# Patient Record
Sex: FEMALE | Race: WHITE | Employment: OTHER | URBAN - METROPOLITAN AREA
[De-identification: names, ages, dates, MRNs, and addresses within clinical notes are randomized per-mention and may not be internally consistent; named-entity substitution may affect disease eponyms.]

---

## 2017-06-18 ENCOUNTER — OFFICE VISIT (OUTPATIENT)
Dept: URGENT CARE | Facility: URGENT CARE | Age: 77
End: 2017-06-18
Payer: COMMERCIAL

## 2017-06-18 VITALS
WEIGHT: 153 LBS | OXYGEN SATURATION: 95 % | TEMPERATURE: 97.5 F | DIASTOLIC BLOOD PRESSURE: 73 MMHG | BODY MASS INDEX: 25.07 KG/M2 | SYSTOLIC BLOOD PRESSURE: 135 MMHG | HEART RATE: 75 BPM

## 2017-06-18 DIAGNOSIS — R82.90 NONSPECIFIC FINDING ON EXAMINATION OF URINE: ICD-10-CM

## 2017-06-18 DIAGNOSIS — N30.01 ACUTE CYSTITIS WITH HEMATURIA: Primary | ICD-10-CM

## 2017-06-18 LAB
ALBUMIN UR-MCNC: 30 MG/DL
APPEARANCE UR: ABNORMAL
BACTERIA #/AREA URNS HPF: ABNORMAL /HPF
BILIRUB UR QL STRIP: NEGATIVE
COLOR UR AUTO: YELLOW
GLUCOSE UR STRIP-MCNC: NEGATIVE MG/DL
HGB UR QL STRIP: ABNORMAL
KETONES UR STRIP-MCNC: NEGATIVE MG/DL
LEUKOCYTE ESTERASE UR QL STRIP: ABNORMAL
NITRATE UR QL: NEGATIVE
NON-SQ EPI CELLS #/AREA URNS LPF: ABNORMAL /LPF
PH UR STRIP: 6 PH (ref 5–7)
RBC #/AREA URNS AUTO: ABNORMAL /HPF (ref 0–2)
SP GR UR STRIP: 1.02 (ref 1–1.03)
URN SPEC COLLECT METH UR: ABNORMAL
UROBILINOGEN UR STRIP-ACNC: 0.2 EU/DL (ref 0.2–1)
WBC #/AREA URNS AUTO: >100 /HPF (ref 0–2)

## 2017-06-18 PROCEDURE — 81001 URINALYSIS AUTO W/SCOPE: CPT | Performed by: STUDENT IN AN ORGANIZED HEALTH CARE EDUCATION/TRAINING PROGRAM

## 2017-06-18 PROCEDURE — 87088 URINE BACTERIA CULTURE: CPT | Performed by: STUDENT IN AN ORGANIZED HEALTH CARE EDUCATION/TRAINING PROGRAM

## 2017-06-18 PROCEDURE — 87186 SC STD MICRODIL/AGAR DIL: CPT | Performed by: STUDENT IN AN ORGANIZED HEALTH CARE EDUCATION/TRAINING PROGRAM

## 2017-06-18 PROCEDURE — 99213 OFFICE O/P EST LOW 20 MIN: CPT | Performed by: STUDENT IN AN ORGANIZED HEALTH CARE EDUCATION/TRAINING PROGRAM

## 2017-06-18 PROCEDURE — 87086 URINE CULTURE/COLONY COUNT: CPT | Performed by: STUDENT IN AN ORGANIZED HEALTH CARE EDUCATION/TRAINING PROGRAM

## 2017-06-18 RX ORDER — CIPROFLOXACIN 250 MG/1
250 TABLET, FILM COATED ORAL 2 TIMES DAILY
Qty: 6 TABLET | Refills: 0
Start: 2017-06-18 | End: 2018-05-04

## 2017-06-18 RX ORDER — FLUCONAZOLE 150 MG/1
150 TABLET ORAL ONCE
Qty: 1 TABLET | Refills: 0
Start: 2017-06-18 | End: 2017-06-18

## 2017-06-18 RX ORDER — SOLIFENACIN SUCCINATE 10 MG/1
TABLET, FILM COATED ORAL
Refills: 3 | COMMUNITY
Start: 2017-04-10

## 2017-06-18 RX ORDER — SIMVASTATIN 40 MG
TABLET ORAL
Refills: 3 | COMMUNITY
Start: 2017-06-05

## 2017-06-18 NOTE — MR AVS SNAPSHOT
"              After Visit Summary   2017    Avelina Walker    MRN: [id number]           Patient Information     Date Of Birth          1940        Visit Information        Provider Department      2017 1:40 PM Mika Kimble PA-C [facility]        Today's Diagnoses     Acute cystitis with hematuria    -  1       Follow-ups after your visit        Who to contact     If you have questions or need follow up information about today's clinic visit or your schedule please contact [facility] directly at [phone].  Normal or non-critical lab and imaging results will be communicated to you by ConnectSofthart, letter or phone within 4 business days after the clinic has received the results. If you do not hear from us within 7 days, please contact the clinic through ConnectSofthart or phone. If you have a critical or abnormal lab result, we will notify you by phone as soon as possible.  Submit refill requests through StatAce or call your pharmacy and they will forward the refill request to us. Please allow 3 business days for your refill to be completed.          Additional Information About Your Visit        MyChart Information     StatAce lets you send messages to your doctor, view your test results, renew your prescriptions, schedule appointments and more. To sign up, go to [email or web address] . Click on \"Log in\" on the left side of the screen, which will take you to the Welcome page. Then click on \"Sign up Now\" on the right side of the page.     You will be asked to enter the access code listed below, as well as some personal information. Please follow the directions to create your username and password.     Your access code is: [email or web address]  Expires: 2017  2:31 PM     Your access code will  in 90 days. If you need help or a new code, please call your [facility] or [phone].        Care EveryWhere ID     This is your Care EveryWhere ID. This " could be used by other organizations to access your [place] medical records  [id number]        Your Vitals Were     Pulse Temperature Pulse Oximetry Breastfeeding? BMI (Body Mass Index)       75 97.5  F (36.4  C) (Oral) 95% No 25.07 kg/m2        Blood Pressure from Last 3 Encounters:   06/18/17 135/73   12/18/16 139/81   09/28/14 141/90    Weight from Last 3 Encounters:   06/18/17 153 lb (69.4 kg)   12/18/16 152 lb (68.9 kg)   09/28/14 147 lb (66.7 kg)              We Performed the Following     UA with Microscopic reflex to Culture          Today's Medication Changes          These changes are accurate as of: 6/18/17  2:31 PM.  If you have any questions, ask your nurse or doctor.               Start taking these medicines.        Dose/Directions    ciprofloxacin 250 MG tablet   Commonly known as:  CIPRO   Used for:  Acute cystitis with hematuria   Started by:  Mika Kimble PA-C        Dose:  250 mg   Take 1 tablet (250 mg) by mouth 2 times daily   Quantity:  6 tablet   Refills:  0       fluconazole 150 MG tablet   Commonly known as:  DIFLUCAN   Used for:  Acute cystitis with hematuria   Started by:  Mika Kimble PA-C        Dose:  150 mg   Take 1 tablet (150 mg) by mouth once for 1 dose   Quantity:  1 tablet   Refills:  0            Where to get your medicines      Some of these will need a paper prescription and others can be bought over the counter.  Ask your nurse if you have questions.     You don't need a prescription for these medications     ciprofloxacin 250 MG tablet    fluconazole 150 MG tablet                Primary Care Provider Office Phone # Fax #    Paolo Porter [phone]       [facility] [address]  [facility] [address]        Thank you!     Thank you for choosing [facility]  for your care. Our goal is always to provide you with excellent care. Hearing back from our patients is one way we can  continue to improve our services. Please take a few minutes to complete the written survey that you may receive in the mail after your visit with us. Thank you!             Your Updated Medication List - Protect others around you: Learn how to safely use, store and throw away your medicines at www.disposemymeds.org.          This list is accurate as of: 6/18/17  2:31 PM.  Always use your most recent med list.                   Brand Name Dispense Instructions for use    CELEBREX PO      Take  by mouth.       ciprofloxacin 250 MG tablet    CIPRO    6 tablet    Take 1 tablet (250 mg) by mouth 2 times daily       DAILY MULTIVITAMIN PO          fluconazole 150 MG tablet    DIFLUCAN    1 tablet    Take 1 tablet (150 mg) by mouth once for 1 dose       MIRAPEX PO      Take  by mouth.       simvastatin 40 MG tablet    ZOCOR     TK 1 T PO QPM       VESICARE 10 MG tablet   Generic drug:  solifenacin      TK 1 T PO QD

## 2017-06-18 NOTE — NURSING NOTE
"Chief Complaint   Patient presents with     UTI     Pt c/o dysuria since this morning.       Initial /73 (BP Location: Left arm, Patient Position: Chair, Cuff Size: Adult Regular)  Pulse 75  Temp 97.5  F (36.4  C) (Oral)  Wt 153 lb (69.4 kg)  SpO2 95%  Breastfeeding? No  BMI 25.07 kg/m2 Estimated body mass index is 25.07 kg/(m^2) as calculated from the following:    Height as of 12/18/16: 5' 5.5\" (1.664 m).    Weight as of this encounter: 153 lb (69.4 kg).  Medication Reconciliation: complete     Shara Mejia CMA (AAMA)      "

## 2017-06-18 NOTE — LETTER
[facility]  [address]  [place] [facility] [address]             June 21, 2017    Avelina Walker  [address]  [place] [place] [place] [address]              Dear Avelina,    The results of your recent tests were normal.  Enclosed is a copy of the results.  It was a pleasure to see you at your last appointment.    Your urine culture was positive. Finish the Cipro antibiotic and follow up with your Primary Care Provider if no resolution of symptoms.     Please notify patient with above.     Daisy Bermudez PA-C     Color Urine       Yellow   Appearance Urine       Slightly Cloudy   Glucose Urine      NEG mg/dL Negative   Bilirubin Urine      NEG Negative   Ketones Urine      NEG mg/dL Negative   Specific Gravity Urine      1.003 - 1.035 1.025   pH Urine      5.0 - 7.0 pH 6.0   Protein Albumin Urine      NEG mg/dL 30 (A)   Urobilinogen Urine      0.2 - 1.0 EU/dL 0.2   Nitrite Urine      NEG Negative   Blood Urine      NEG Large (A)   Leukocyte Esterase Urine      NEG Large (A)   Source       Midstream Urine   WBC Urine      0 - 2 /HPF >100 (A)   RBC Urine      0 - 2 /HPF 25-50 (A)   Squamous Epithelial /LPF Urine      FEW /LPF Few   Bacteria Urine      NEG /HPF Many (A)       Sincerely,      Daisy Bermudez PAC

## 2017-06-20 LAB
BACTERIA SPEC CULT: ABNORMAL
MICRO REPORT STATUS: ABNORMAL
MICROORGANISM SPEC CULT: ABNORMAL
SPECIMEN SOURCE: ABNORMAL

## 2017-09-16 ENCOUNTER — OFFICE VISIT (OUTPATIENT)
Dept: URGENT CARE | Facility: URGENT CARE | Age: 77
End: 2017-09-16
Payer: COMMERCIAL

## 2017-09-16 ENCOUNTER — RADIANT APPOINTMENT (OUTPATIENT)
Dept: GENERAL RADIOLOGY | Facility: CLINIC | Age: 77
End: 2017-09-16
Attending: FAMILY MEDICINE
Payer: COMMERCIAL

## 2017-09-16 VITALS
BODY MASS INDEX: 24.81 KG/M2 | SYSTOLIC BLOOD PRESSURE: 127 MMHG | DIASTOLIC BLOOD PRESSURE: 72 MMHG | WEIGHT: 151.4 LBS | TEMPERATURE: 99.5 F | OXYGEN SATURATION: 93 % | HEART RATE: 84 BPM

## 2017-09-16 DIAGNOSIS — R05.9 COUGH: ICD-10-CM

## 2017-09-16 DIAGNOSIS — J20.9 ACUTE BRONCHITIS, UNSPECIFIED ORGANISM: Primary | ICD-10-CM

## 2017-09-16 PROCEDURE — 71020 XR CHEST 2 VW: CPT

## 2017-09-16 PROCEDURE — 99213 OFFICE O/P EST LOW 20 MIN: CPT | Performed by: FAMILY MEDICINE

## 2017-09-16 NOTE — PATIENT INSTRUCTIONS

## 2017-09-16 NOTE — MR AVS SNAPSHOT
After Visit Summary   9/16/2017    Avelina Walker    MRN: [id number]           Patient Information     Date Of Birth          1940        Visit Information        Provider Department      9/16/2017 10:00 AM Dennis Ponce MD [facility]        Today's Diagnoses     Cough    -  1    Acute bronchitis, unspecified organism          Care Instructions      Bronchitis, Viral (Adult)    You have a viral bronchitis. Bronchitis is inflammation and swelling of the lining of the lungs. This is often caused by an infection. Symptoms include a dry, hacking cough that is worse at night. The cough may bring up yellow-green mucus. You may also feel short of breath or wheeze. Other symptoms may include tiredness, chest discomfort, and chills.  Bronchitis that is caused by a virus is not treated with antibiotics. Instead, medicines may be given to help relieve symptoms. Symptoms can last up to 2 weeks, although the cough may last much longer.  This illness is contagious during the first few days and is spread through the air by coughing and sneezing, or by direct contact (touching the sick person and then touching your own eyes, nose, or mouth).  Most viral illnesses resolve within 10 to 14 days with rest and simple home remedies, although they may sometimes last for several weeks.  Home care    If symptoms are severe, rest at home for the first 2 to 3 days. When you go back to your usual activities, don't let yourself get too tired.    Do not smoke. Also avoid being exposed to secondhand smoke.    You may use over-the-counter medicine to control fever or pain, unless another pain medicine was prescribed. (Note: If you have chronic liver or kidney disease or have ever had a stomach ulcer or gastrointestinal bleeding, talk with your healthcare provider before using these medicines. Also talk to your provider if you are taking medicine to prevent blood clots.) Aspirin should never be given to  anyone younger than 18 years of age who is ill with a viral infection or fever. It may cause severe liver or brain damage.    Your appetite may be poor, so a light diet is fine. Avoid dehydration by drinking 6 to 8 glasses of fluids per day (such as water, soft drinks, sports drinks, juices, tea, or soup). Extra fluids will help loosen secretions in the nose and lungs.    Over-the-counter cough, cold, and sore-throat medicines will not shorten the length of the illness, but they may help to reduce symptoms. (Note: Do not use decongestants if you have high blood pressure.)  Follow-up care  Follow up with your healthcare provider, or as advised. If you had an X-ray or ECG (electrocardiogram), a specialist will review it. You will be notified of any new findings that may affect your care.  Note: If you are age 65 or older, or if you have a chronic lung disease or condition that affects your immune system, or you smoke, talk to your healthcare provider about having pneumococcal vaccinations and a yearly influenza vaccination (flu shot).  When to seek medical advice  Call your healthcare provider right away if any of these occur:    Fever of 100.4 F (38 C) or higher    Coughing up increased amounts of colored sputum    Weakness, drowsiness, headache, facial pain, ear pain, or a stiff neck  Call 911, or get immediate medical care  Contact emergency services right away if any of these occur:    Coughing up blood    Worsening weakness, drowsiness, headache, or stiff neck    Trouble breathing, wheezing, or pain with breathing  Date Last Reviewed: 9/13/2015 2000-2017 [employer]. [address] [place] [address], [place] [address]. All rights reserved. This information is not intended as a substitute for professional medical care. Always follow your healthcare professional's instructions.                Follow-ups after your visit        Who to contact     If you have questions or need follow up information about  "today's clinic visit or your schedule please contact [facility] directly at [phone].  Normal or non-critical lab and imaging results will be communicated to you by Etixhart, letter or phone within 4 business days after the clinic has received the results. If you do not hear from us within 7 days, please contact the clinic through Etixhart or phone. If you have a critical or abnormal lab result, we will notify you by phone as soon as possible.  Submit refill requests through Numerate or call your pharmacy and they will forward the refill request to us. Please allow 3 business days for your refill to be completed.          Additional Information About Your Visit        EtixharPatientFocus Information     Numerate lets you send messages to your doctor, view your test results, renew your prescriptions, schedule appointments and more. To sign up, go to [email or web address] . Click on \"Log in\" on the left side of the screen, which will take you to the Welcome page. Then click on \"Sign up Now\" on the right side of the page.     You will be asked to enter the access code listed below, as well as some personal information. Please follow the directions to create your username and password.     Your access code is: [email or web address]  Expires: 2017  2:31 PM     Your access code will  in 90 days. If you need help or a new code, please call your [place] clinic or [phone].        Care EveryWhere ID     This is your Care EveryWhere ID. This could be used by other organizations to access your [place] medical records  [id number]        Your Vitals Were     Pulse Temperature Pulse Oximetry BMI (Body Mass Index)          84 99.5  F (37.5  C) (Oral) 93% 24.81 kg/m2         Blood Pressure from Last 3 Encounters:   17 127/72   17 135/73   16 139/81    Weight from Last 3 Encounters:   17 151 lb 6.4 oz (68.7 kg)   17 153 lb (69.4 kg)   16 152 lb (68.9 kg)               Primary " Care Provider Office Phone # Fax #    Paolo Porter [phone]       [facility] [id number]  [place] [facility] [address]        Equal Access to Services     PERI DOMINGO : Hadolivier preston ku anao Socrystalali, waaxda luqadaha, qaybta kaalmada adeegyada, yvan millern urvashi cleary lamarko waddell. So [facility] [phone].    ATENCIÓN: Si habla español, tiene a silvestre disposición servicios gratuitos de asistencia lingüística. Llame al [phone].    We comply with applicable federal civil rights laws and [place] laws. We do not discriminate on the basis of race, color, national origin, age, disability sex, sexual orientation or gender identity.            Thank you!     Thank you for choosing [facility]  for your care. Our goal is always to provide you with excellent care. Hearing back from our patients is one way we can continue to improve our services. Please take a few minutes to complete the written survey that you may receive in the mail after your visit with us. Thank you!             Your Updated Medication List - Protect others around you: Learn how to safely use, store and throw away your medicines at www.disposemymeds.org.          This list is accurate as of: 9/16/17 10:42 AM.  Always use your most recent med list.                   Brand Name Dispense Instructions for use Diagnosis    CELEBREX PO      Take  by mouth.        ciprofloxacin 250 MG tablet    CIPRO    6 tablet    Take 1 tablet (250 mg) by mouth 2 times daily    Acute cystitis with hematuria       DAILY MULTIVITAMIN PO           MIRAPEX PO      Take  by mouth.        simvastatin 40 MG tablet    ZOCOR     TK 1 T PO QPM        VESICARE 10 MG tablet   Generic drug:  solifenacin      TK 1 T PO QD

## 2017-09-16 NOTE — PROGRESS NOTES
SUBJECTIVE:   Avelina Walker is a 77 year old female who presents to clinic today for the following health issues:      Cough, fever      Duration: 2 days    Description (location/character/radiation): chest    Intensity:  moderate    Accompanying signs and symptoms: cough, fever    History (similar episodes/previous evaluation): None    Precipitating or alleviating factors: None    Therapies tried and outcome: tylenol         Problem list and histories reviewed & adjusted, as indicated.  Additional history: as documented    Patient Active Problem List   Diagnosis     Blepharoconjunctivitis of both eyes     Past Surgical History:   Procedure Laterality Date     BREAST SURGERY      reductions     BUNIONECTOMY       COLONOSCOPY  7/16/2014    Procedure: COMBINED COLONOSCOPY, SINGLE BIOPSY/POLYPECTOMY BY BIOPSY;  Surgeon: Brenna Wu MD;  Location: [facility]     HYSTERECTOMY VAGINAL, BILATERAL SALPINGO-OOPHERECTOMY, COMBINED       ORTHOPEDIC SURGERY      left total knee replacement       Social History   Substance Use Topics     Smoking status: Never Smoker     Smokeless tobacco: Never Used     Alcohol use No     No family history on file.      Current Outpatient Prescriptions   Medication Sig Dispense Refill     simvastatin (ZOCOR) 40 MG tablet TK 1 T PO QPM  3     VESICARE 10 MG tablet TK 1 T PO QD  3     Multiple Vitamin (DAILY MULTIVITAMIN PO)        Celecoxib (CELEBREX PO) Take  by mouth.       Pramipexole Dihydrochloride (MIRAPEX PO) Take  by mouth.       ciprofloxacin (CIPRO) 250 MG tablet Take 1 tablet (250 mg) by mouth 2 times daily (Patient not taking: Reported on 9/16/2017) 6 tablet 0     Allergies   Allergen Reactions     Latex      No lab results found.   BP Readings from Last 3 Encounters:   09/16/17 127/72   06/18/17 135/73   12/18/16 139/81    Wt Readings from Last 3 Encounters:   09/16/17 151 lb 6.4 oz (68.7 kg)   06/18/17 153 lb (69.4 kg)   12/18/16 152 lb (68.9 kg)                  Labs  reviewed in EPIC          Reviewed and updated as needed this visit by clinical staff     Reviewed and updated as needed this visit by Provider         ROS:  Constitutional, HEENT, cardiovascular, pulmonary, gi and gu systems are negative, except as otherwise noted.      OBJECTIVE:   /72 (BP Location: Left arm, Patient Position: Chair, Cuff Size: Adult Regular)  Pulse 84  Temp 99.5  F (37.5  C) (Oral)  Wt 151 lb 6.4 oz (68.7 kg)  SpO2 93%  BMI 24.81 kg/m2  Body mass index is 24.81 kg/(m^2).  GENERAL: healthy, alert and no distress  EYES: Eyes grossly normal to inspection, PERRL and conjunctivae and sclerae normal  HENT: ear canals and TM's normal, nose and mouth without ulcers or lesions  NECK: no adenopathy, no asymmetry, masses, or scars and thyroid normal to palpation  RESP: lungs clear to auscultation - no rales, rhonchi or wheezes  CV: regular rate and rhythm, normal S1 S2, no S3 or S4, no murmur, click or rub, no peripheral edema and peripheral pulses strong, no calf tenderness or swelling  ABDOMEN: soft, nontender, no hepatosplenomegaly, no masses and bowel sounds normal  MS: no gross musculoskeletal defects noted, no edema  NEURO: Normal strength and tone, mentation intact and speech normal  PSYCH: mentation appears normal, affect normal/bright    XR CHEST 2 VW   9/16/2017 10:29 AM      HISTORY: cough, Cough     COMPARISON: None.     FINDINGS: The heart is negative.  The lungs are clear. The pulmonary  vasculature is normal.  The bones and soft tissues are unremarkable.         IMPRESSION: No active infiltrate.                 ASSESSMENT/PLAN:         ICD-10-CM    1. Acute bronchitis, unspecified organism J20.9    2. Cough R05 XR Chest 2 Views       Discussed in detail differentials and further management. Symptoms are likely secondary to viral infection. Chest X-ray findings discussed. Recommended well hydration, over-the-counter analgesia, warm fluids and saline gargles. Written  instructions/information provided. Patient understood and in agreement with the above plan. All questions are answered. Follow-up if symptoms persist or worsen.        Patient Instructions     Bronchitis, Viral (Adult)    You have a viral bronchitis. Bronchitis is inflammation and swelling of the lining of the lungs. This is often caused by an infection. Symptoms include a dry, hacking cough that is worse at night. The cough may bring up yellow-green mucus. You may also feel short of breath or wheeze. Other symptoms may include tiredness, chest discomfort, and chills.  Bronchitis that is caused by a virus is not treated with antibiotics. Instead, medicines may be given to help relieve symptoms. Symptoms can last up to 2 weeks, although the cough may last much longer.  This illness is contagious during the first few days and is spread through the air by coughing and sneezing, or by direct contact (touching the sick person and then touching your own eyes, nose, or mouth).  Most viral illnesses resolve within 10 to 14 days with rest and simple home remedies, although they may sometimes last for several weeks.  Home care    If symptoms are severe, rest at home for the first 2 to 3 days. When you go back to your usual activities, don't let yourself get too tired.    Do not smoke. Also avoid being exposed to secondhand smoke.    You may use over-the-counter medicine to control fever or pain, unless another pain medicine was prescribed. (Note: If you have chronic liver or kidney disease or have ever had a stomach ulcer or gastrointestinal bleeding, talk with your healthcare provider before using these medicines. Also talk to your provider if you are taking medicine to prevent blood clots.) Aspirin should never be given to anyone younger than 18 years of age who is ill with a viral infection or fever. It may cause severe liver or brain damage.    Your appetite may be poor, so a light diet is fine. Avoid dehydration by  drinking 6 to 8 glasses of fluids per day (such as water, soft drinks, sports drinks, juices, tea, or soup). Extra fluids will help loosen secretions in the nose and lungs.    Over-the-counter cough, cold, and sore-throat medicines will not shorten the length of the illness, but they may help to reduce symptoms. (Note: Do not use decongestants if you have high blood pressure.)  Follow-up care  Follow up with your healthcare provider, or as advised. If you had an X-ray or ECG (electrocardiogram), a specialist will review it. You will be notified of any new findings that may affect your care.  Note: If you are age 65 or older, or if you have a chronic lung disease or condition that affects your immune system, or you smoke, talk to your healthcare provider about having pneumococcal vaccinations and a yearly influenza vaccination (flu shot).  When to seek medical advice  Call your healthcare provider right away if any of these occur:    Fever of 100.4 F (38 C) or higher    Coughing up increased amounts of colored sputum    Weakness, drowsiness, headache, facial pain, ear pain, or a stiff neck  Call 911, or get immediate medical care  Contact emergency services right away if any of these occur:    Coughing up blood    Worsening weakness, drowsiness, headache, or stiff neck    Trouble breathing, wheezing, or pain with breathing  Date Last Reviewed: 9/13/2015 2000-2017 [employer]. [address] [place] [id number]. All rights reserved. This information is not intended as a substitute for professional medical care. Always follow your healthcare professional's instructions.            Dennis Ponce MD  [facility]

## 2017-09-20 ENCOUNTER — OFFICE VISIT (OUTPATIENT)
Dept: FAMILY MEDICINE | Facility: CLINIC | Age: 77
End: 2017-09-20
Payer: COMMERCIAL

## 2017-09-20 VITALS
TEMPERATURE: 98.1 F | WEIGHT: 151 LBS | HEART RATE: 69 BPM | OXYGEN SATURATION: 96 % | DIASTOLIC BLOOD PRESSURE: 80 MMHG | BODY MASS INDEX: 24.75 KG/M2 | SYSTOLIC BLOOD PRESSURE: 146 MMHG

## 2017-09-20 DIAGNOSIS — R05.9 COUGH: Primary | ICD-10-CM

## 2017-09-20 DIAGNOSIS — J40 BRONCHITIS: ICD-10-CM

## 2017-09-20 PROCEDURE — 87801 DETECT AGNT MULT DNA AMPLI: CPT | Performed by: PHYSICIAN ASSISTANT

## 2017-09-20 PROCEDURE — 99213 OFFICE O/P EST LOW 20 MIN: CPT | Performed by: PHYSICIAN ASSISTANT

## 2017-09-20 RX ORDER — BENZONATATE 200 MG/1
200 CAPSULE ORAL 3 TIMES DAILY PRN
Qty: 30 CAPSULE | Refills: 1 | Status: SHIPPED | OUTPATIENT
Start: 2017-09-20 | End: 2018-05-04

## 2017-09-20 NOTE — PROGRESS NOTES
SUBJECTIVE:   Avelina Walker is a 77 year old female who presents to clinic today for the following health issues:      ED/UC Followup:    Facility:  [facility]  Date of visit: 09/16/17 for 2 days of constant cough, negative CXR  Reason for visit: Coughing  Current Status: Not getting better         A little sinus drainage, no fevers      Allergies   Allergen Reactions     Latex        Past Medical History:   Diagnosis Date     Osteoarthritis      Restless legs syndrome          Current Outpatient Prescriptions on File Prior to Visit:  simvastatin (ZOCOR) 40 MG tablet TK 1 T PO QPM   VESICARE 10 MG tablet TK 1 T PO QD   ciprofloxacin (CIPRO) 250 MG tablet Take 1 tablet (250 mg) by mouth 2 times daily (Patient not taking: Reported on 9/16/2017)   Multiple Vitamin (DAILY MULTIVITAMIN PO)    Celecoxib (CELEBREX PO) Take  by mouth.   Pramipexole Dihydrochloride (MIRAPEX PO) Take  by mouth.     No current facility-administered medications on file prior to visit.     Social History   Substance Use Topics     Smoking status: Never Smoker     Smokeless tobacco: Never Used     Alcohol use No       ROS:  Consitutional: As above  ENT: As above  Respiratory: As above    OBJECTIVE:  /80 (BP Location: Right arm, Patient Position: Chair, Cuff Size: Adult Regular)  Pulse 69  Temp 98.1  F (36.7  C) (Oral)  Wt 151 lb (68.5 kg)  SpO2 96%  Breastfeeding? No  BMI 24.75 kg/m2  GENERAL APPEARANCE: healthy, alert and no distress  EYES: conjunctiva clear  HENT:    TMs w/o erythema, effusion or bulging.  Nose and mouth without ulcers, erythema or lesions.  NO tonsillar enlargement erythema or exudates.   NECK: supple, nontender, no lymphadenopathy  RESP: lungs clear to auscultation - no rales, rhonchi or wheezes  CV: regular rates and rhythm, normal S1 S2, no murmur noted  NEURO: awake, alert          ASSESSMENT: Well appearing.    ICD-10-CM    1. Cough R05 benzonatate (TESSALON) 200 MG capsule     Bordetella per/paraper PCR    2. Bronchitis J40          PLAN:  Lots of rest and fluids.  RTC if any worsening symptoms or if not improving.    Nick Mckay PA-C

## 2017-09-20 NOTE — LETTER
September 25, 2017      Avelina Walker  [address] [id number]        Dear ,    We are writing to inform you of your test results.    Your test was normal.     Please contact the clinic if you have additional questions or if symptoms persist.  Thank you.     Resulted Orders   Bordetella per/paraper PCR   Result Value Ref Range    Specimen Description Nasopharyngeal     Bordetella Per/Paraper PCR Negative NEG^Negative      Comment:      Negative for B. pertussis and B. parapertussis by PCR     This test was developed and its performance characteristics determined by the   [facility] at [place], [place]. The PCR test has proven to be more sensitive than culture and   highly specific.  A false positive for B. pertussis may occur in samples   containing B. holmesii DNA.  A false positive for B. parapertussis may occur in samples containing B.   bronchiseptica DNA. Both B. holmesii and B. bronchiseptica rarely cause   disease in humans.  A negative result does not rule out the presence of B.   pertussis or B. parapertussis DNA in concentrations below detection level of   the assay. Nasopharyngeal swabs are the preferred specimen types.  Analyte Specific Reagents (ASRs) are used in many laboratory tests necessary   for standard medical care and generally do not require U.S. Food and Drug   Administration approval. The FDA has determined that such clearance or     approval is not necessary. This test is used for clinical purposes. It should   not be regarded as investigational or for research.  This laboratory is certified under the Clinical Laboratory Improvement   Amendments of 1988 (CLIA-88) as qualified to perform high complexity clinical   laboratory testing.         If you have any questions or concerns, please call the clinic at the number listed above.       Sincerely,        Sukumar Mckay,  PA

## 2017-09-20 NOTE — PATIENT INSTRUCTIONS
Acute Bronchitis  Your health care provider has told you that you have acute bronchitis. Bronchitis is infection or inflammation of the bronchial tubes (airways in the lungs). Normally, air moves easily in and out of the airways. Bronchitis narrows the airways, making it harder for air to flow in and out of the lungs. This causes symptoms such as shortness of breath, coughing, and wheezing. Bronchitis can be  acute  or  chronic.  Acute means the condition comes on quickly and goes away in a short time. Chronic means a condition lasts a long time and often comes back. Read on to learn more about acute bronchitis.  What Causes Acute Bronchitis?  Acute bronchitis almost always starts as a viral respiratory infection, such as a cold or the flu. Certain factors make it more likely for a cold or flu to turn into bronchitis. These include being very young or very old or having a heart or lung problem. Cigarette smoking also makes bronchitis more likely.  When bronchitis develops, the airways become swollen. The airways may also become infected with bacteria. This is known as a secondary infection.  Diagnosing Acute Bronchitis  Your health care provider will examine you and ask about your symptoms and health history. You may also have a sputum culture to test the fluid in your lungs. Chest X-rays may be done to look for infection in the lungs.  Treating Acute Bronchitis  Bronchitis usually clears up as the cold or flu goes away. You can help feel better faster by doing the following:  Take medication as directed. You may be told to take ibuprofen or other over-the-counter medications. These help relieve inflammation in your bronchial tubes. Your doctor may prescribe an inhaler to help open up the bronchial tubes. If you have a bacterial infection, you may be prescribed antibiotics. If so, take all of this medication as directed until it is gone, even if you feel better.  Drink plenty of fluids, such as water, juice, or warm  soup. Fluids loosen mucus so that you can cough it up. This helps you breathe more easily. Fluids also prevent dehydration.  Make sure you get plenty of rest.  Do not smoke. Do not allow anyone else to smoke in your home.  Recovery and Follow-Up  Follow up with your doctor as you are told. You will likely feel better in a week or two. But a dry cough can linger beyond that time. Let your doctor know if you still have symptoms (other than a dry cough) after 2 weeks. If you re prone to getting bronchial infections, let your doctor know. And take steps to protect yourself from future infections. These steps include stopping smoking and avoiding tobacco smoke, washing your hands often, and getting a yearly flu shot.  When to Call the Doctor  Call the doctor if you have any of the following:  Fever of 100.4 F (38.0 C) higher  Symptoms that get worse, or new symptoms  Trouble breathing  Symptoms that don t start to improve within a week, or within 3 days of taking antibiotics     4002-4351 [employer] [facility] [place], [address] [place] [address], [place] [address]. All rights reserved. This information is not intended as a substitute for professional medical care. Always follow your healthcare professional's instructions.

## 2017-09-20 NOTE — NURSING NOTE
"Chief Complaint   Patient presents with     Cough     Pt was seen on 09/16/17 and she states that the doctor did not give her medication. She states that her cough is getting worse.       Initial /80 (BP Location: Right arm, Patient Position: Chair, Cuff Size: Adult Regular)  Pulse 69  Temp 98.1  F (36.7  C) (Oral)  Wt 151 lb (68.5 kg)  SpO2 96%  Breastfeeding? No  BMI 24.75 kg/m2 Estimated body mass index is 24.75 kg/(m^2) as calculated from the following:    Height as of 12/18/16: 5' 5.5\" (1.664 m).    Weight as of this encounter: 151 lb (68.5 kg).  Medication Reconciliation: complete   Ashley Mantilla MA          "

## 2017-09-20 NOTE — MR AVS SNAPSHOT
After Visit Summary   9/20/2017    Avelina Walker    MRN: [id number]           Patient Information     Date Of Birth          1940        Visit Information        Provider Department      9/20/2017 10:00 AM Sukumar Mckay PA [facility]        Today's Diagnoses     Cough    -  1    Bronchitis          Care Instructions    Acute Bronchitis  Your health care provider has told you that you have acute bronchitis. Bronchitis is infection or inflammation of the bronchial tubes (airways in the lungs). Normally, air moves easily in and out of the airways. Bronchitis narrows the airways, making it harder for air to flow in and out of the lungs. This causes symptoms such as shortness of breath, coughing, and wheezing. Bronchitis can be  acute  or  chronic.  Acute means the condition comes on quickly and goes away in a short time. Chronic means a condition lasts a long time and often comes back. Read on to learn more about acute bronchitis.  What Causes Acute Bronchitis?  Acute bronchitis almost always starts as a viral respiratory infection, such as a cold or the flu. Certain factors make it more likely for a cold or flu to turn into bronchitis. These include being very young or very old or having a heart or lung problem. Cigarette smoking also makes bronchitis more likely.  When bronchitis develops, the airways become swollen. The airways may also become infected with bacteria. This is known as a secondary infection.  Diagnosing Acute Bronchitis  Your health care provider will examine you and ask about your symptoms and health history. You may also have a sputum culture to test the fluid in your lungs. Chest X-rays may be done to look for infection in the lungs.  Treating Acute Bronchitis  Bronchitis usually clears up as the cold or flu goes away. You can help feel better faster by doing the following:  Take medication as directed. You may be told to take ibuprofen or other  over-the-counter medications. These help relieve inflammation in your bronchial tubes. Your doctor may prescribe an inhaler to help open up the bronchial tubes. If you have a bacterial infection, you may be prescribed antibiotics. If so, take all of this medication as directed until it is gone, even if you feel better.  Drink plenty of fluids, such as water, juice, or warm soup. Fluids loosen mucus so that you can cough it up. This helps you breathe more easily. Fluids also prevent dehydration.  Make sure you get plenty of rest.  Do not smoke. Do not allow anyone else to smoke in your home.  Recovery and Follow-Up  Follow up with your doctor as you are told. You will likely feel better in a week or two. But a dry cough can linger beyond that time. Let your doctor know if you still have symptoms (other than a dry cough) after 2 weeks. If you re prone to getting bronchial infections, let your doctor know. And take steps to protect yourself from future infections. These steps include stopping smoking and avoiding tobacco smoke, washing your hands often, and getting a yearly flu shot.  When to Call the Doctor  Call the doctor if you have any of the following:  Fever of 100.4 F (38.0 C) higher  Symptoms that get worse, or new symptoms  Trouble breathing  Symptoms that don t start to improve within a week, or within 3 days of taking antibiotics     [phone] [employer] [facility], [address] [place], [place] [address]. All rights reserved. This information is not intended as a substitute for professional medical care. Always follow your healthcare professional's instructions.                Follow-ups after your visit        Follow-up notes from your care team     Return if symptoms worsen or fail to improve.      Who to contact     If you have questions or need follow up information about today's clinic visit or your schedule please contact [facility] FELIPE MARTINEZ directly at [phone].  Normal or non-critical  "lab and imaging results will be communicated to you by MyChart, letter or phone within 4 business days after the clinic has received the results. If you do not hear from us within 7 days, please contact the clinic through [employer] or phone. If you have a critical or abnormal lab result, we will notify you by phone as soon as possible.  Submit refill requests through [employer] or call your pharmacy and they will forward the refill request to us. Please allow 3 business days for your refill to be completed.          Additional Information About Your Visit        [employer] Information     [employer] lets you send messages to your doctor, view your test results, renew your prescriptions, schedule appointments and more. To sign up, go to [email or web address] . Click on \"Log in\" on the left side of the screen, which will take you to the Welcome page. Then click on \"Sign up Now\" on the right side of the page.     You will be asked to enter the access code listed below, as well as some personal information. Please follow the directions to create your username and password.     Your access code is: [email or web address]  Expires: 2017 10:10 AM     Your access code will  in 90 days. If you need help or a new code, please call your [facility] clinic or [phone].        Care EveryWhere ID     This is your Care EveryWhere ID. This could be used by other organizations to access your [facility] medical records  [id number]        Your Vitals Were     Pulse Temperature Pulse Oximetry Breastfeeding? BMI (Body Mass Index)       69 98.1  F (36.7  C) (Oral) 96% No 24.75 kg/m2        Blood Pressure from Last 3 Encounters:   17 146/80   17 127/72   17 135/73    Weight from Last 3 Encounters:   17 151 lb (68.5 kg)   17 151 lb 6.4 oz (68.7 kg)   17 153 lb (69.4 kg)              We Performed the Following     Bordetella per/paraper PCR          Today's Medication Changes          These changes are accurate " as of: 9/20/17 10:10 AM.  If you have any questions, ask your nurse or doctor.               Start taking these medicines.        Dose/Directions    benzonatate 200 MG capsule   Commonly known as:  TESSALON   Used for:  Cough   Started by:  Sukumar Mckay PA        Dose:  200 mg   Take 1 capsule (200 mg) by mouth 3 times daily as needed for cough   Quantity:  30 capsule   Refills:  1            Where to get your medicines      These medications were sent to [id number] - [place], [place] - [address] [facility] [address], [facility] [address]     Phone:  [phone]     benzonatate 200 MG capsule                Primary Care Provider Office Phone # Fax #    Paolo Porter [phone]       [facility] [id number]  [facility] [address]        Equal Access to Services     PERI DOMINGO AH: Hadii aad ku hadasho Soomaali, waaxda luqadaha, qaybta kaalmada adeegyada, waxay idiin hayaan urvashi ojeda . So [facility] [phone].    ATENCIÓN: Si habla español, tiene a silvestre disposición servicios gratuitos de asistencia lingüística. Llame al [phone].    We comply with applicable federal civil rights laws and [place] laws. We do not discriminate on the basis of race, color, national origin, age, disability sex, sexual orientation or gender identity.            Thank you!     Thank you for choosing [facility]  for your care. Our goal is always to provide you with excellent care. Hearing back from our patients is one way we can continue to improve our services. Please take a few minutes to complete the written survey that you may receive in the mail after your visit with us. Thank you!             Your Updated Medication List - Protect others around you: Learn how to safely use, store and throw away your medicines at www.disposemymeds.org.          This list is accurate as of: 9/20/17 10:10 AM.   Always use your most recent med list.                   Brand Name Dispense Instructions for use Diagnosis    benzonatate 200 MG capsule    TESSALON    30 capsule    Take 1 capsule (200 mg) by mouth 3 times daily as needed for cough    Cough       CELEBREX PO      Take  by mouth.        ciprofloxacin 250 MG tablet    CIPRO    6 tablet    Take 1 tablet (250 mg) by mouth 2 times daily    Acute cystitis with hematuria       DAILY MULTIVITAMIN PO           MIRAPEX PO      Take  by mouth.        simvastatin 40 MG tablet    ZOCOR     TK 1 T PO QPM        VESICARE 10 MG tablet   Generic drug:  solifenacin      TK 1 T PO QD

## 2017-09-22 LAB
B PERT+PARAPERT DNA PNL SPEC NAA+PROBE: NEGATIVE
SPECIMEN SOURCE: NORMAL

## 2017-09-22 NOTE — PROGRESS NOTES
Ms. Walker,    Your test was normal.    Please contact the clinic if you have additional questions or if symptoms persist.  Thank you.    Sincerely,    Sukumar Mckay

## 2018-05-04 ENCOUNTER — OFFICE VISIT (OUTPATIENT)
Dept: FAMILY MEDICINE | Facility: CLINIC | Age: 78
End: 2018-05-04
Payer: COMMERCIAL

## 2018-05-04 VITALS
TEMPERATURE: 98.6 F | OXYGEN SATURATION: 96 % | DIASTOLIC BLOOD PRESSURE: 70 MMHG | WEIGHT: 159 LBS | RESPIRATION RATE: 20 BRPM | BODY MASS INDEX: 25.55 KG/M2 | SYSTOLIC BLOOD PRESSURE: 122 MMHG | HEIGHT: 66 IN | HEART RATE: 87 BPM

## 2018-05-04 DIAGNOSIS — R07.0 THROAT PAIN: ICD-10-CM

## 2018-05-04 DIAGNOSIS — J06.9 UPPER RESPIRATORY TRACT INFECTION, UNSPECIFIED TYPE: Primary | ICD-10-CM

## 2018-05-04 LAB
DEPRECATED S PYO AG THROAT QL EIA: NORMAL
SPECIMEN SOURCE: NORMAL

## 2018-05-04 PROCEDURE — 87081 CULTURE SCREEN ONLY: CPT | Performed by: FAMILY MEDICINE

## 2018-05-04 PROCEDURE — 87880 STREP A ASSAY W/OPTIC: CPT | Performed by: FAMILY MEDICINE

## 2018-05-04 PROCEDURE — 99213 OFFICE O/P EST LOW 20 MIN: CPT | Performed by: FAMILY MEDICINE

## 2018-05-04 ASSESSMENT — PAIN SCALES - GENERAL: PAINLEVEL: NO PAIN (0)

## 2018-05-04 NOTE — PROGRESS NOTES
"  SUBJECTIVE:   Avelina Walker is a 77 year old female who presents to clinic today for the following health issues:      RESPIRATORY SYMPTOMS      Duration: last night    Description  nasal congestion, rhinorrhea, sore throat and myalgias    Severity: mild    Accompanying signs and symptoms: None    History (predisposing factors):  none    Precipitating or alleviating factors: None    Therapies tried and outcome:  none      Problem list and histories reviewed & adjusted, as indicated.  Additional history: as documented    Patient Active Problem List   Diagnosis     Blepharoconjunctivitis of both eyes     Past Surgical History:   Procedure Laterality Date     BREAST SURGERY      reductions     BUNIONECTOMY       COLONOSCOPY  7/16/2014    Procedure: COMBINED COLONOSCOPY, SINGLE BIOPSY/POLYPECTOMY BY BIOPSY;  Surgeon: Brenna Wu MD;  Location:  GI     HYSTERECTOMY VAGINAL, BILATERAL SALPINGO-OOPHERECTOMY, COMBINED       ORTHOPEDIC SURGERY      left total knee replacement       Social History   Substance Use Topics     Smoking status: Never Smoker     Smokeless tobacco: Never Used     Alcohol use No     History reviewed. No pertinent family history.        Reviewed and updated as needed this visit by clinical staff  Tobacco  Allergies  Meds  Med Hx  Surg Hx  Fam Hx  Soc Hx      Reviewed and updated as needed this visit by Provider         ROS:  Constitutional, HEENT, cardiovascular, pulmonary, GI, , musculoskeletal, neuro, skin, endocrine and psych systems are negative, except as otherwise noted.    OBJECTIVE:     /70 (BP Location: Left arm, Patient Position: Chair, Cuff Size: Adult Large)  Pulse 87  Temp 98.6  F (37  C) (Oral)  Resp 20  Ht 5' 5.5\" (1.664 m)  Wt 159 lb (72.1 kg)  SpO2 96%  BMI 26.06 kg/m2  Body mass index is 26.06 kg/(m^2).  GENERAL: healthy, alert and no distress  NECK: no adenopathy, no asymmetry, masses, or scars and thyroid normal to palpation  RESP: lungs clear to " auscultation - no rales, rhonchi or wheezes  CV: regular rate and rhythm, normal S1 S2, no S3 or S4, no murmur, click or rub, no peripheral edema and peripheral pulses strong  ABDOMEN: soft, nontender, no hepatosplenomegaly, no masses and bowel sounds normal  MS: no gross musculoskeletal defects noted, no edema    Diagnostic Test Results:  none     ASSESSMENT/PLAN:       1. Upper respiratory tract infection, unspecified type  Discussed length of illness (3-10 days for normal people and may be longer for smokers)  Discussed modes of transmission (hands, droplets, surfaces) and ways to prevent spreading (washing hands, using arm for coughing/sneezing, cleaning surfaces.)  Treatment is symptomatic treatment.  Discussed possible ways for treatment:  Rhinorrhea (nasal discharge)/sneezing   -ipratropium nasal 0.06% spray   -1st generation antihistamine: diphenhydramine   -intranasal cromolyn sodium  Cough   -dextromethorphan   -dextromethorphan plus albuterol inhaler   -codeine  Fever/Sore throat   -ibuprofen and/or tylenol prn  Nasal congestion   -pseudoephedrine   -phenylephrine   -Oxymetazoline 0.05% (Afrin)     -not to use more than 5 days due to risk for rebounding  Expectorants   -guaifenesin  Prophylaxis against URI for people exposed to vigorous activities in extreme cold conditions   -vitamin C 200-500mg daily  Please see Epic orders.  Push fluids.  Discussed signs or symptoms that would indicate need for recheck.  Patient agrees with plan.      2. Throat pain  Rapid negative.  Will await culture.  Most likely viral.  Discussed symptomatic treatment with salt water gargles, lozenges (menthol containing lozenges, Sucrets lozenges with dyclonine, Cepacol or Chloraseptic lozenges with benzocaine), systemic analgesics with ibuprofen and/or tylenol and may use oral glucocorticoids for severe pain and inability to swallow if necessary.  - Strep, Rapid Screen    See Patient Instructions    Isaiah Stringer MD, MD  [place]  [facility]

## 2018-05-04 NOTE — PATIENT INSTRUCTIONS
At [facility], we strive to deliver an exceptional experience to you, every time we see you.  If you receive a survey in the mail, please send us back your thoughts. We really do value your feedback.    Based on your medical history, these are the current health maintenance/preventive care services that you are due for (some may have been done at this visit.)  Health Maintenance Due   Topic Date Due     LIPID SCREEN Q5 YR FEMALE (SYSTEM ASSIGNED)  08/13/1984     ADVANCE DIRECTIVE PLANNING Q5 YRS  08/13/1994     DEXA SCAN SCREENING (SYSTEM ASSIGNED)  08/13/2004     PNEUMOCOCCAL (2 of 2 - PPSV23) 04/27/2017         Suggested websites for health information:  Www.[place].org : Up to date and easily searchable information on multiple topics.  Www.medlineplus.gov : medication info, interactive tutorials, watch real surgeries online  Www.familydoctor.org : good info from the Academy of Family Physicians  Www.cdc.gov : public health info, travel advisories, epidemics (H1N1)  Www.aap.org : children's health info, normal development, vaccinations  Www.health.[facility].[place].us : [place] dept of health, public health issues in [place], N1N1    Your care team:                            Family Medicine Internal Medicine   MD Frank Leone MD Shantel Branch-Fleming, MD Katya Georgiev PA-C Nam Ho, MD Pediatrics   VALDO Jonas, MD Melisa Peguero CNP, MD Deborah Mielke, MD Kim Thein, APRN [facility]      Clinic hours: Monday - Thursday 7 am-7 pm; Fridays 7 am-5 pm.   Urgent care: Monday - Friday 11 am-9 pm; Saturday and Sunday 9 am-5 pm.  Pharmacy : Monday -Thursday 8 am-8 pm; Friday 8 am-6 pm; Saturday and Sunday 9 am-5 pm.     Clinic: [phone]   Pharmacy: [phone]

## 2018-05-04 NOTE — MR AVS SNAPSHOT
After Visit Summary   5/4/2018    Avelina Walker    MRN: [id number]           Patient Information     Date Of Birth          1940        Visit Information        Provider Department      5/4/2018 3:20 PM Isaiah Stringer MD [facility]        Today's Diagnoses     Upper respiratory tract infection, unspecified type    -  1    Throat pain          Care Instructions    At [facility], we strive to deliver an exceptional experience to you, every time we see you.  If you receive a survey in the mail, please send us back your thoughts. We really do value your feedback.    Based on your medical history, these are the current health maintenance/preventive care services that you are due for (some may have been done at this visit.)  Health Maintenance Due   Topic Date Due     LIPID SCREEN Q5 YR FEMALE (SYSTEM ASSIGNED)  08/13/1984     ADVANCE DIRECTIVE PLANNING Q5 YRS  08/13/1994     DEXA SCAN SCREENING (SYSTEM ASSIGNED)  08/13/2004     PNEUMOCOCCAL (2 of 2 - PPSV23) 04/27/2017         Suggested websites for health information:  Www.Teleport.LiveAction : Up to date and easily searchable information on multiple topics.  Www.medlineplus.gov : medication info, interactive tutorials, watch real surgeries online  Www.familydoctor.org : good info from the Academy of Family Physicians  Www.cdc.gov : public health info, travel advisories, epidemics (H1N1)  Www.aap.org : children's health info, normal development, vaccinations  Www.health.state.[place].us : [place] dept of health, public health issues in [place], N1N1    Your care team:                            Family Medicine Internal Medicine   MD Frank Leone MD Shantel Branch-Fleming, MD Katya Georgiev PA-C Nam Ho, MD Pediatrics   VALDO Jonas, MD Melisa Peguero CNP, MD Deborah Mielke, MD Kim Thein, APRN CNP      Clinic hours: Monday - Thursday 7 am-7 pm;  " 7 am-5 pm.   Urgent care: Monday - Friday 11 am-9 pm; Saturday and  9 am-5 pm.  Pharmacy : Monday -Thursday 8 am-8 pm; Friday 8 am-6 pm; Saturday and  9 am-5 pm.     Clinic: [phone]   Pharmacy: [phone]            Follow-ups after your visit        Who to contact     If you have questions or need follow up information about today's clinic visit or your schedule please contact [facility] directly at [phone].  Normal or non-critical lab and imaging results will be communicated to you by MyChart, letter or phone within 4 business days after the clinic has received the results. If you do not hear from us within 7 days, please contact the clinic through MyChart or phone. If you have a critical or abnormal lab result, we will notify you by phone as soon as possible.  Submit refill requests through The [employer] website or call your pharmacy and they will forward the refill request to us. Please allow 3 business days for your refill to be completed.          Additional Information About Your Visit        [facility] Information     The [employer] website lets you send messages to your doctor, view your test results, renew your prescriptions, schedule appointments and more. To sign up, go to [email or web address] . Click on \"Log in\" on the left side of the screen, which will take you to the Welcome page. Then click on \"Sign up Now\" on the right side of the page.     You will be asked to enter the access code listed below, as well as some personal information. Please follow the directions to create your username and password.     Your access code is: [email or web address]  Expires: 2018  3:54 PM     Your access code will  in 90 days. If you need help or a new code, please call your [place] clinic or [phone].        Care EveryWhere ID     This is your Care EveryWhere ID. This could be used by other organizations to access your [place] medical records  [id number]        Your Vitals Were     " "Pulse Temperature Respirations Height Pulse Oximetry BMI (Body Mass Index)    87 98.6  F (37  C) (Oral) 20 5' 5.5\" (1.664 m) 96% 26.06 kg/m2       Blood Pressure from Last 3 Encounters:   05/04/18 122/70   09/20/17 146/80   09/16/17 127/72    Weight from Last 3 Encounters:   05/04/18 159 lb (72.1 kg)   09/20/17 151 lb (68.5 kg)   09/16/17 151 lb 6.4 oz (68.7 kg)              We Performed the Following     Beta strep group A culture     Strep, Rapid Screen        Primary Care Provider Office Phone # Fax #    Paolo Porter [phone]       [facility] [id number]  [facility] [address]        Equal Access to Services     PERI DOMINGO : Hadii aad ku hadasho Soomaali, waaxda luqadaha, qaybta kaalmada adeegyada, yvan dobbs haydolores ojeda . So [facility] [phone].    ATENCIÓN: Si habla español, tiene a silvestre disposición servicios gratuitos de asistencia lingüística. Alison al [phone].    We comply with applicable federal civil rights laws and [place] laws. We do not discriminate on the basis of race, color, national origin, age, disability, sex, sexual orientation, or gender identity.            Thank you!     Thank you for choosing [facility]  for your care. Our goal is always to provide you with excellent care. Hearing back from our patients is one way we can continue to improve our services. Please take a few minutes to complete the written survey that you may receive in the mail after your visit with us. Thank you!             Your Updated Medication List - Protect others around you: Learn how to safely use, store and throw away your medicines at www.disposemymeds.org.          This list is accurate as of 5/4/18  3:54 PM.  Always use your most recent med list.                   Brand Name Dispense Instructions for use Diagnosis    CELEBREX PO      Take  by mouth.        DAILY MULTIVITAMIN PO           MIRAPEX PO      Take  by mouth.     "    simvastatin 40 MG tablet    ZOCOR     TK 1 T PO QPM        VESICARE 10 MG tablet   Generic drug:  solifenacin      TK 1 T PO QD

## 2018-05-05 LAB
BACTERIA SPEC CULT: NORMAL
SPECIMEN SOURCE: NORMAL

## 2018-05-31 NOTE — PROGRESS NOTES
SUBJECTIVE:                                                    Avelina Walker is a 76 year old female who presents to clinic today for the following health issues:    URINARY TRACT SYMPTOMS      Duration: this morning    Description  Dysuria    Intensity:  moderate    Accompanying signs and symptoms:  Fever/chills: no   Flank pain no   Nausea and vomiting: no   Vaginal symptoms: none  Abdominal/Pelvic Pain: no     History  History of frequent UTI's: no   History of kidney stones: no   Sexually Active: No  Possibility of pregnancy: No    Precipitating or alleviating factors: None    Therapies tried and outcome: none   Outcome:      77 y/o presenting to the clinic for evaluation of possible UTI. She notes a one day history dysuria. She notes it began this morning. She states it is a mild burn, but she wanted to get it checked out. She denies increased frequency or urgency. Denies flank pain, fever, nausea, vomiting, and abdominal pain.    No recent UTIs    No concern for STIs    Past Medical History:   Diagnosis Date     Osteoarthritis      Restless legs syndrome      Past Surgical History:   Procedure Laterality Date     BREAST SURGERY      reductions     BUNIONECTOMY       COLONOSCOPY  7/16/2014    Procedure: COMBINED COLONOSCOPY, SINGLE BIOPSY/POLYPECTOMY BY BIOPSY;  Surgeon: Brenna Wu MD;  Location:  GI     HYSTERECTOMY VAGINAL, BILATERAL SALPINGO-OOPHERECTOMY, COMBINED       ORTHOPEDIC SURGERY      left total knee replacement     Social History     Social History     Marital status:      Spouse name: N/A     Number of children: N/A     Years of education: N/A     Occupational History     Not on file.     Social History Main Topics     Smoking status: Never Smoker     Smokeless tobacco: Never Used     Alcohol use No     Drug use: No     Sexual activity: Not on file     Other Topics Concern     Not on file     Social History Narrative     No family history on file.    Labs:   Results for  orders placed or performed in visit on 06/18/17   UA with Microscopic reflex to Culture   Result Value Ref Range    Color Urine Yellow     Appearance Urine Slightly Cloudy     Glucose Urine Negative NEG mg/dL    Bilirubin Urine Negative NEG    Ketones Urine Negative NEG mg/dL    Specific Gravity Urine 1.025 1.003 - 1.035    pH Urine 6.0 5.0 - 7.0 pH    Protein Albumin Urine 30 (A) NEG mg/dL    Urobilinogen Urine 0.2 0.2 - 1.0 EU/dL    Nitrite Urine Negative NEG    Blood Urine Large (A) NEG    Leukocyte Esterase Urine Large (A) NEG    Source Midstream Urine     WBC Urine >100 (A) 0 - 2 /HPF    RBC Urine 25-50 (A) 0 - 2 /HPF    Squamous Epithelial /LPF Urine Few FEW /LPF    Bacteria Urine Many (A) NEG /HPF           /73 (BP Location: Left arm, Patient Position: Chair, Cuff Size: Adult Regular)  Pulse 75  Temp 97.5  F (36.4  C) (Oral)  Wt 153 lb (69.4 kg)  SpO2 95%  Breastfeeding? No  BMI 25.07 kg/m2  Physical Exam   Constitutional: She is well-developed, well-nourished, and in no distress. No distress.   HENT:   Head: Normocephalic and atraumatic.   Eyes: Pupils are equal, round, and reactive to light. Right eye exhibits no discharge. Left eye exhibits no discharge.   Neck: Normal range of motion.   Cardiovascular: Normal rate, regular rhythm and normal heart sounds.  Exam reveals no gallop and no friction rub.    No murmur heard.  Pulmonary/Chest: Effort normal and breath sounds normal.   Abdominal: Soft. Bowel sounds are normal.   Skin: She is not diaphoretic.     ASSESSMENT AND PLAN:    Avelina was seen today for uti.    Diagnoses and all orders for this visit:    Acute cystitis with hematuria  -     UA with Microscopic reflex to Culture  -     ciprofloxacin (CIPRO) 250 MG tablet; Take 1 tablet (250 mg) by mouth 2 times daily  -     fluconazole (DIFLUCAN) 150 MG tablet; Take 1 tablet (150 mg) by mouth once for 1 dose      Patient's presentation and physical exam is consistent with acute cystitis. UA  showed bacteria and WBC. She is being started on Cipro 250 mg BID for 3 days. She should continue drinking fluids, void when she has the urge. Return to clinic if new or worsening symptoms present. Presentation is not consistent with a complicated UTI.    Fluconazole is being provided for treatment with yeast infection follows treatment. Patient does have a history of yeast infections 2/2 antibiotics.     Culture is sent. Patient will be notified with there are signs of resistance and new medication will be provided.    Mika Kimble PA-C       no

## 2018-07-28 ENCOUNTER — OFFICE VISIT (OUTPATIENT)
Dept: URGENT CARE | Facility: URGENT CARE | Age: 78
End: 2018-07-28
Payer: COMMERCIAL

## 2018-07-28 VITALS
DIASTOLIC BLOOD PRESSURE: 81 MMHG | WEIGHT: 154 LBS | SYSTOLIC BLOOD PRESSURE: 148 MMHG | BODY MASS INDEX: 25.24 KG/M2 | HEART RATE: 78 BPM | RESPIRATION RATE: 12 BRPM | TEMPERATURE: 97.9 F

## 2018-07-28 DIAGNOSIS — N39.0 URINARY TRACT INFECTION WITHOUT HEMATURIA, SITE UNSPECIFIED: Primary | ICD-10-CM

## 2018-07-28 DIAGNOSIS — R30.0 DYSURIA: ICD-10-CM

## 2018-07-28 DIAGNOSIS — R82.90 NONSPECIFIC FINDING ON EXAMINATION OF URINE: ICD-10-CM

## 2018-07-28 LAB
ALBUMIN UR-MCNC: NEGATIVE MG/DL
APPEARANCE UR: ABNORMAL
BACTERIA #/AREA URNS HPF: ABNORMAL /HPF
BILIRUB UR QL STRIP: NEGATIVE
COLOR UR AUTO: YELLOW
GLUCOSE UR STRIP-MCNC: NEGATIVE MG/DL
HGB UR QL STRIP: ABNORMAL
KETONES UR STRIP-MCNC: NEGATIVE MG/DL
LEUKOCYTE ESTERASE UR QL STRIP: ABNORMAL
NITRATE UR QL: POSITIVE
PH UR STRIP: 5.5 PH (ref 5–7)
RBC #/AREA URNS AUTO: ABNORMAL /HPF
SOURCE: ABNORMAL
SP GR UR STRIP: >1.03 (ref 1–1.03)
UROBILINOGEN UR STRIP-ACNC: 0.2 EU/DL (ref 0.2–1)
WBC #/AREA URNS AUTO: ABNORMAL /HPF

## 2018-07-28 PROCEDURE — 99213 OFFICE O/P EST LOW 20 MIN: CPT | Performed by: PHYSICIAN ASSISTANT

## 2018-07-28 PROCEDURE — 87086 URINE CULTURE/COLONY COUNT: CPT | Performed by: PHYSICIAN ASSISTANT

## 2018-07-28 PROCEDURE — 87186 SC STD MICRODIL/AGAR DIL: CPT | Performed by: PHYSICIAN ASSISTANT

## 2018-07-28 PROCEDURE — 87088 URINE BACTERIA CULTURE: CPT | Performed by: PHYSICIAN ASSISTANT

## 2018-07-28 PROCEDURE — 81001 URINALYSIS AUTO W/SCOPE: CPT | Performed by: PHYSICIAN ASSISTANT

## 2018-07-28 RX ORDER — CIPROFLOXACIN 250 MG/1
250 TABLET, FILM COATED ORAL 2 TIMES DAILY
Qty: 10 TABLET | Refills: 0 | Status: SHIPPED | OUTPATIENT
Start: 2018-07-28 | End: 2018-08-02

## 2018-07-28 RX ORDER — FLUCONAZOLE 150 MG/1
TABLET ORAL
Qty: 2 TABLET | Refills: 0 | Status: SHIPPED | OUTPATIENT
Start: 2018-07-28

## 2018-07-28 NOTE — MR AVS SNAPSHOT
After Visit Summary   7/28/2018    Avelina Walker    MRN: [id number]           Patient Information     Date Of Birth          1940        Visit Information        Provider Department      7/28/2018 1:10 PM Daisy Bermudez PA-C [facility]        Today's Diagnoses     Urinary tract infection without hematuria, site unspecified    -  1    Dysuria           Follow-ups after your visit        Who to contact     If you have questions or need follow up information about today's clinic visit or your schedule please contact [facility] directly at [phone].  Normal or non-critical lab and imaging results will be communicated to you by MyChart, letter or phone within 4 business days after the clinic has received the results. If you do not hear from us within 7 days, please contact the clinic through MyChart or phone. If you have a critical or abnormal lab result, we will notify you by phone as soon as possible.  Submit refill requests through Whatever or call your pharmacy and they will forward the refill request to us. Please allow 3 business days for your refill to be completed.          Additional Information About Your Visit        Care EveryWhere ID     This is your Care EveryWhere ID. This could be used by other organizations to access your [place] medical records  [id number]        Your Vitals Were     Pulse Temperature Respirations BMI (Body Mass Index)          78 97.9  F (36.6  C) 12 25.24 kg/m2         Blood Pressure from Last 3 Encounters:   07/28/18 148/81   05/04/18 122/70   09/20/17 146/80    Weight from Last 3 Encounters:   07/28/18 154 lb (69.9 kg)   05/04/18 159 lb (72.1 kg)   09/20/17 151 lb (68.5 kg)              We Performed the Following     *UA reflex to Microscopic and Culture ([place] and [facility] (except [place] and [place])     Urine Microscopic          Today's Medication Changes          These changes are accurate as of  7/28/18  1:40 PM.  If you have any questions, ask your nurse or doctor.               Start taking these medicines.        Dose/Directions    ciprofloxacin 250 MG tablet   Commonly known as:  CIPRO   Used for:  Urinary tract infection without hematuria, site unspecified   Started by:  Daisy Bermudez PA-C        Dose:  250 mg   Take 1 tablet (250 mg) by mouth 2 times daily for 5 days   Quantity:  10 tablet   Refills:  0       fluconazole 150 MG tablet   Commonly known as:  DIFLUCAN   Used for:  Urinary tract infection without hematuria, site unspecified   Started by:  Daisy Bermudez PA-C        Take one po once. Repeat in 1 week if still with symptoms   Quantity:  2 tablet   Refills:  0            Where to get your medicines      These medications were sent to [id number] - [place], [place] - [address]  [address] [address]     Phone:  [phone]     ciprofloxacin 250 MG tablet    fluconazole 150 MG tablet                Primary Care Provider Office Phone # Fax #    Paolo Porter [phone]       [facility] [place] [id number] [place] [id number]        Equal Access to Services     LORNE DOMINGO : Hadii preston ku hadasho Soomaali, waaxda luqadaha, qaybta kaalmada adeegyada, yvan ojeda . So [facility] [phone].    ATENCIÓN: Si habla español, tiene a silvestre disposición servicios gratuitos de asistencia lingüística. [facility] [phone].    We comply with applicable federal civil rights laws and [place] laws. We do not discriminate on the basis of race, color, national origin, age, disability, sex, sexual orientation, or gender identity.            Thank you!     Thank you for choosing [facility]  for your care. Our goal is always to provide you with excellent care. Hearing back from our patients is one way we can continue to improve our services. Please take a few minutes to complete  the written survey that you may receive in the mail after your visit with us. Thank you!             Your Updated Medication List - Protect others around you: Learn how to safely use, store and throw away your medicines at www.disposemymeds.org.          This list is accurate as of 7/28/18  1:40 PM.  Always use your most recent med list.                   Brand Name Dispense Instructions for use Diagnosis    CELEBREX PO      Take  by mouth.        ciprofloxacin 250 MG tablet    CIPRO    10 tablet    Take 1 tablet (250 mg) by mouth 2 times daily for 5 days    Urinary tract infection without hematuria, site unspecified       DAILY MULTIVITAMIN PO           fluconazole 150 MG tablet    DIFLUCAN    2 tablet    Take one po once. Repeat in 1 week if still with symptoms    Urinary tract infection without hematuria, site unspecified       MIRAPEX PO      Take  by mouth.        simvastatin 40 MG tablet    ZOCOR     TK 1 T PO QPM        VESICARE 10 MG tablet   Generic drug:  solifenacin      TK 1 T PO QD

## 2018-07-28 NOTE — PROGRESS NOTES
S: 76 yo female with dysuria and urgency x 2 days. No back or pelvic pain. No abnl vag dc. No fever, vomiting. No h/o kidney problems.           Allergies   Allergen Reactions     Latex        Past Medical History:   Diagnosis Date     Osteoarthritis      Restless legs syndrome          Current Outpatient Prescriptions on File Prior to Visit:  Celecoxib (CELEBREX PO) Take  by mouth.   Multiple Vitamin (DAILY MULTIVITAMIN PO)    Pramipexole Dihydrochloride (MIRAPEX PO) Take  by mouth.   simvastatin (ZOCOR) 40 MG tablet TK 1 T PO QPM   VESICARE 10 MG tablet TK 1 T PO QD     No current facility-administered medications on file prior to visit.     Social History   Substance Use Topics     Smoking status: Never Smoker     Smokeless tobacco: Never Used     Alcohol use No       ROS:  General: negative for fever  ABD: Denies abd pain  : as above    OBJECTIVE:  /81  Pulse 78  Temp 97.9  F (36.6  C)  Resp 12  Wt 154 lb (69.9 kg)  BMI 25.24 kg/m2     General:   awake, alert, and cooperative.  NAD.   Head: Normocephalic, atraumatic.  Eyes: Conjunctiva clear, non icteric.   ABD: soft, no tenderness to palpation , no rigidity, guarding or rebound . No CVAT  Neuro: Alert and oriented - normal speech.   Results for orders placed or performed in visit on 07/28/18   *UA reflex to Microscopic and Culture ([place] and [facility] (except [place] and [place])   Result Value Ref Range    Color Urine Yellow     Appearance Urine Slightly Cloudy     Glucose Urine Negative NEG^Negative mg/dL    Bilirubin Urine Negative NEG^Negative    Ketones Urine Negative NEG^Negative mg/dL    Specific Gravity Urine >1.030 1.003 - 1.035    Blood Urine Trace (A) NEG^Negative    pH Urine 5.5 5.0 - 7.0 pH    Protein Albumin Urine Negative NEG^Negative mg/dL    Urobilinogen Urine 0.2 0.2 - 1.0 EU/dL    Nitrite Urine Positive (A) NEG^Negative    Leukocyte Esterase Urine Moderate (A) NEG^Negative    Source Midstream Urine    Urine Microscopic    Result Value Ref Range    WBC Urine  (A) OTO5^0 - 5 /HPF    RBC Urine O - 2 OTO2^O - 2 /HPF    Bacteria Urine Many (A) NEG^Negative /HPF       ASSESSMENT:    ICD-10-CM    1. Urinary tract infection without hematuria, site unspecified N39.0 ciprofloxacin (CIPRO) 250 MG tablet     fluconazole (DIFLUCAN) 150 MG tablet   2. Dysuria R30.0 *UA reflex to Microscopic and Culture ([place] and [facility] (except [place] and [place])     Urine Microscopic             PLAN:   As per ordered above.  Drink plenty of fluids.  Prevention and treatment of UTI's discussed. Follow up with primary care physician if not improving.  Advised about symptoms which might herald more serious problems.      Daisy Bermudez PA-C

## 2018-07-30 ENCOUNTER — TELEPHONE (OUTPATIENT)
Dept: URGENT CARE | Facility: URGENT CARE | Age: 78
End: 2018-07-30

## 2018-07-30 NOTE — TELEPHONE ENCOUNTER
Notes Recorded by Daisy Bermudez PA-C on 7/30/2018 at 7:11 AM  Your urine culture was positive. Finish the Cipro antibiotic and follow up with your Primary Care Provider if no resolution of symptoms.    Please notify patient with above.    Daisy Bermudez PA-C    This writer attempted to contact Avelina on 07/30/18      Reason for call results and left message.      If patient calls back:   Patient contacted by a Registered Nurse. Inform patient that someone from the RN group will contact them, document that pt called and route to P DYAD 3 RN POOL [[id number]]        Katrina Oswald RN

## 2018-07-31 LAB
BACTERIA SPEC CULT: ABNORMAL
SPECIMEN SOURCE: ABNORMAL

## 2018-07-31 NOTE — TELEPHONE ENCOUNTER
This writer attempted to contact patient on 07/31/18      Reason for call results and left message.      If patient calls back:   Patient contacted by a Registered Nurse. Inform patient that someone from the RN group will contact them, document that pt called and route to P DYAD 3 RN POOL [[id number]]        Andria Xie RN

## 2018-07-31 NOTE — TELEPHONE ENCOUNTER
Patient  returned call    Best number to reach caller: Home number on file [phone] (home)    Is it ok to leave a detailed message: YES

## 2018-07-31 NOTE — TELEPHONE ENCOUNTER
...Reason for Call:  Other     Detailed comments: Patient called back and would like someone to return her call.    Phone Number Patient can be reached at: Home number on file [phone] (home)    Best Time: anytime    Can we leave a detailed message on this number? YES    Call taken on 7/31/2018 at 9:35 AM by Nichelle Monreal

## 2018-07-31 NOTE — TELEPHONE ENCOUNTER
This writer attempted to contact patient on 07/31/18      Reason for call results and left detailed message.      If patient calls back:   Patient contacted by a Registered Nurse. Inform patient that someone from the RN group will contact them, document that pt called and route to P DYAD 3 RN POOL [[id number]]        Andria Xie RN

## 2018-08-08 ENCOUNTER — OFFICE VISIT (OUTPATIENT)
Dept: URGENT CARE | Facility: URGENT CARE | Age: 78
End: 2018-08-08
Payer: COMMERCIAL

## 2018-08-08 VITALS
WEIGHT: 154.2 LBS | OXYGEN SATURATION: 95 % | BODY MASS INDEX: 25.27 KG/M2 | SYSTOLIC BLOOD PRESSURE: 150 MMHG | DIASTOLIC BLOOD PRESSURE: 80 MMHG | HEART RATE: 78 BPM

## 2018-08-08 DIAGNOSIS — K13.79 BLEEDING IN MOUTH: ICD-10-CM

## 2018-08-08 DIAGNOSIS — Z98.890 HISTORY OF RECENT DENTAL PROCEDURE: Primary | ICD-10-CM

## 2018-08-08 PROCEDURE — 99213 OFFICE O/P EST LOW 20 MIN: CPT | Performed by: NURSE PRACTITIONER

## 2018-08-08 ASSESSMENT — ENCOUNTER SYMPTOMS
CARDIOVASCULAR NEGATIVE: 1
NEUROLOGICAL NEGATIVE: 1
CONSTITUTIONAL NEGATIVE: 1
BRUISES/BLEEDS EASILY: 1
RESPIRATORY NEGATIVE: 1

## 2018-08-08 NOTE — MR AVS SNAPSHOT
After Visit Summary   2018    Avelina Walker    MRN: [id number]           Patient Information     Date Of Birth          1940        Visit Information        Provider Department      2018 7:00 PM Yelitza Bazan APRN [facility]        Today's Diagnoses     History of recent dental procedure    -  1    Bleeding in mouth          Care Instructions    If the bleeding resumes, bite down on gauze and do not remove or look for a minimum of 30 minutes. If bleeding resumes and is heavy or you are not able to get it to stop please seek recheck.       Understanding Root Canal: Overview  Most of us try to keep our teeth clean and looking nice. But we don t think much about what s inside our teeth--until something goes wrong. When problems develop inside a tooth, root canal therapy may be the only way to save it.  The whole tooth    The parts of a tooth include:    The crown. This part of the tooth is above the gumline. It has a hard surface (enamel) for biting and chewing.    The root. Below the gumline, the root anchors the tooth to the bone.    The pulp. The middle of the tooth (the pulp chamber) contains the pulp. This soft tissue is made up mainly of blood vessels and nerves.    The root canal. This is the pathway from the pulp chamber to the nerves and blood vessels in the jawbone.  Pulp problems    Pulp problems most often happen when decay or an injury damages the tooth s crown and exposes the pulp. Once this happens, the pulp becomes inflamed. Bacteria in the mouth can infect and kill the pulp. The infection can then spread throughout the pulp chamber and the root canal. If it reaches the tip of the root, it can invade the bone. In some cases it forms a pocket of pus (an abscess). If this process isn t stopped, it leads to bone and tooth loss.  Choosing root canal therapy  Root canal therapy can save a tooth whose pulp has . The earlier the tooth is treated,  the less pain, trouble, and expense you ll face. And though many people believe that root canal therapy is painful, this is just a myth: The treatment rarely causes discomfort.  An overview of treatment  Root canal therapy consists of removing the inflamed or infected pulp. The first step is to make an opening in the crown. The dentist then cleans the pulp chamber and root canals. These spaces are later filled with a rubber-like substance called fina-percha. This acts as a permanent bandage. Finally, restoring the crown of the tooth protects the tooth from more damage or infection. The goals of treatment are to:    Relieve pain and other symptoms.    Stop any infection and prevent its spread.    Save the tooth from having to be extracted.  Risks and complications  Root canal therapy has a high rate of success. If complications do happen, they are often minor and can be treated. Risks and possible complications include:    Pain or infection    Reaction to medicine or anesthesia    Sore jaw joint and nearby muscles    Broken () dental tool    Need for more treatment, such as endodontic surgery   Date Last Reviewed: 8/1/2017 2000-2017 [employer]. [address] [place] [address]. All rights reserved. This information is not intended as a substitute for professional medical care. Always follow your healthcare professional's instructions.                Follow-ups after your visit        Who to contact     If you have questions or need follow up information about today's clinic visit or your schedule please contact [facility] directly at [phone].  Normal or non-critical lab and imaging results will be communicated to you by MyChart, letter or phone within 4 business days after the clinic has received the results. If you do not hear from us within 7 days, please contact the clinic through MyChart or phone. If you have a critical or abnormal lab result, we will  notify you by phone as soon as possible.  Submit refill requests through [employer] or call your pharmacy and they will forward the refill request to us. Please allow 3 business days for your refill to be completed.          Additional Information About Your Visit        Care EveryWhere ID     This is your Care EveryWhere ID. This could be used by other organizations to access your [facility] medical records  [id number]        Your Vitals Were     Pulse Pulse Oximetry BMI (Body Mass Index)             78 95% 25.27 kg/m2          Blood Pressure from Last 3 Encounters:   08/08/18 150/80   07/28/18 148/81   05/04/18 122/70    Weight from Last 3 Encounters:   08/08/18 154 lb 3.2 oz (69.9 kg)   07/28/18 154 lb (69.9 kg)   05/04/18 159 lb (72.1 kg)              Today, you had the following     No orders found for display       Primary Care Provider Office Phone # Fax #    Paolo Porter [phone]       [facility] [address] [facility] [id number]  [facility] [address]        Equal Access to Services     [facility] campusALYSSA : Hadii aad ku hadasho Soomaali, waaxda luqadaha, qaybta kaalmada adeegsamuel, yvan ojeda . So [facility] [phone].    ATENCIÓN: Si habla español, tiene a silvestre disposición servicios gratuitos de asistencia lingüística. Alison al [phone].    We comply with applicable federal civil rights laws and [place] laws. We do not discriminate on the basis of race, color, national origin, age, disability, sex, sexual orientation, or gender identity.            Thank you!     Thank you for choosing [facility]  for your care. Our goal is always to provide you with excellent care. Hearing back from our patients is one way we can continue to improve our services. Please take a few minutes to complete the written survey that you may receive in the mail after your visit with us. Thank you!             Your Updated Medication List - Protect others around  you: Learn how to safely use, store and throw away your medicines at www.disposemymeds.org.          This list is accurate as of 8/8/18  7:57 PM.  Always use your most recent med list.                   Brand Name Dispense Instructions for use Diagnosis    CELEBREX PO      Take  by mouth.        DAILY MULTIVITAMIN PO           fluconazole 150 MG tablet    DIFLUCAN    2 tablet    Take one po once. Repeat in 1 week if still with symptoms    Urinary tract infection without hematuria, site unspecified       MIRAPEX PO      Take  by mouth.        simvastatin 40 MG tablet    ZOCOR     TK 1 T PO QPM        VESICARE 10 MG tablet   Generic drug:  solifenacin      TK 1 T PO QD

## 2018-08-09 NOTE — PROGRESS NOTES
HPI  Avelina Walker 77 year old chief complaint ongoing bleeding at a dental extraction site. She had a bridge removed today and a root canal. She left the dental office at 4:pm. She has been having a continued slow oozing at the procedure site. It is noted that she takes 1/2 aspirin po daily. She denies ongoing pain or dizziness.    Past Medical History:   Diagnosis Date     Osteoarthritis      Restless legs syndrome      Patient Active Problem List   Diagnosis     Blepharoconjunctivitis of both eyes     Past Surgical History:   Procedure Laterality Date     BREAST SURGERY      reductions     BUNIONECTOMY       COLONOSCOPY  7/16/2014    Procedure: COMBINED COLONOSCOPY, SINGLE BIOPSY/POLYPECTOMY BY BIOPSY;  Surgeon: Brenna Wu MD;  Location:  GI     HYSTERECTOMY VAGINAL, BILATERAL SALPINGO-OOPHERECTOMY, COMBINED       ORTHOPEDIC SURGERY      left total knee replacement     Allergies   Allergen Reactions     Gabapentin Other (See Comments)     Latex      Current Outpatient Prescriptions   Medication     Celecoxib (CELEBREX PO)     fluconazole (DIFLUCAN) 150 MG tablet     Multiple Vitamin (DAILY MULTIVITAMIN PO)     Pramipexole Dihydrochloride (MIRAPEX PO)     simvastatin (ZOCOR) 40 MG tablet     VESICARE 10 MG tablet     No current facility-administered medications for this visit.          Review of Systems   Constitutional: Negative.    HENT:        Bleeding at right lower dental procedure site.    Respiratory: Negative.    Cardiovascular: Negative.    Neurological: Negative.    Endo/Heme/Allergies: Bruises/bleeds easily.         Physical Exam   Constitutional: She is well-developed, well-nourished, and in no distress. No distress.   /80  Pulse 78  Wt 154 lb 3.2 oz (69.9 kg)  SpO2 95%  BMI 25.27 kg/m2   HENT:   Head: Normocephalic.   Mouth/Throat: Abnormal dentition. No oropharyngeal exudate.       Cardiovascular: Normal rate.    Pulmonary/Chest: Effort normal.   Neurological: She is alert.    Skin: Skin is warm and dry.   Psychiatric: Mood and affect normal.     PROCEDURE NOTE:   1 ml 1% lidocaine with Epinephrine injected into extraction site with   Full cessation of bleeding.     Assessment:  1. History of recent dental procedure    2. Bleeding in mouth        Plan:  Should bleeding return please bite down on the gauze and hold pressure for 30+ minutes without removing gauze. If bleeding resumes and cannot be controlled you may need to be re-evaluated.   Skip aspirin today and tomorrow  Instructions regarding self-care of patient with post dental procedure bleeding reviewed.   Written instructions provided in after visit summary and reviewed.  Patient instructed to see primary care provider for new or persistent symptoms.   Red flag symptoms reviewed and patient has been instructed to seek emergent   Care at the closest emergency room for evaluation and treatment.   Please contact pharmacy for medication questions.  Patient instructed to take medications as directed on package.

## 2018-08-09 NOTE — PATIENT INSTRUCTIONS
If the bleeding resumes, bite down on gauze and do not remove or look for a minimum of 30 minutes. If bleeding resumes and is heavy or you are not able to get it to stop please seek recheck.       Understanding Root Canal: Overview  Most of us try to keep our teeth clean and looking nice. But we don t think much about what s inside our teeth--until something goes wrong. When problems develop inside a tooth, root canal therapy may be the only way to save it.  The whole tooth    The parts of a tooth include:    The crown. This part of the tooth is above the gumline. It has a hard surface (enamel) for biting and chewing.    The root. Below the gumline, the root anchors the tooth to the bone.    The pulp. The middle of the tooth (the pulp chamber) contains the pulp. This soft tissue is made up mainly of blood vessels and nerves.    The root canal. This is the pathway from the pulp chamber to the nerves and blood vessels in the jawbone.  Pulp problems    Pulp problems most often happen when decay or an injury damages the tooth s crown and exposes the pulp. Once this happens, the pulp becomes inflamed. Bacteria in the mouth can infect and kill the pulp. The infection can then spread throughout the pulp chamber and the root canal. If it reaches the tip of the root, it can invade the bone. In some cases it forms a pocket of pus (an abscess). If this process isn t stopped, it leads to bone and tooth loss.  Choosing root canal therapy  Root canal therapy can save a tooth whose pulp has . The earlier the tooth is treated, the less pain, trouble, and expense you ll face. And though many people believe that root canal therapy is painful, this is just a myth: The treatment rarely causes discomfort.  An overview of treatment  Root canal therapy consists of removing the inflamed or infected pulp. The first step is to make an opening in the crown. The dentist then cleans the pulp chamber and root canals. These spaces are later  filled with a rubber-like substance called fina-percha. This acts as a permanent bandage. Finally, restoring the crown of the tooth protects the tooth from more damage or infection. The goals of treatment are to:    Relieve pain and other symptoms.    Stop any infection and prevent its spread.    Save the tooth from having to be extracted.  Risks and complications  Root canal therapy has a high rate of success. If complications do happen, they are often minor and can be treated. Risks and possible complications include:    Pain or infection    Reaction to medicine or anesthesia    Sore jaw joint and nearby muscles    Broken () dental tool    Need for more treatment, such as endodontic surgery   Date Last Reviewed: 8/1/2017 2000-2017 [employer]. [address] [place] [address], [place] [address]. All rights reserved. This information is not intended as a substitute for professional medical care. Always follow your healthcare professional's instructions.

## 2020-11-05 NOTE — PROGRESS NOTES
History of Present Illness - Avelina Walker is a 80 year old female here to see me for the first time for an ear problem.    She was seen at [facility] and it was noted that she had some hearing loss, RIGHT more than LEFT.  On exam she was noted to have severe cerumen impaction.    Otherwise no history of any ear disease or ear surgery.  No drainage or pain from the ears.    Past Medical History -   Patient Active Problem List   Diagnosis     Blepharoconjunctivitis of both eyes       Current Medications -   Current Outpatient Medications:      Celecoxib (CELEBREX PO), Take  by mouth., Disp: , Rfl:      fluconazole (DIFLUCAN) 150 MG tablet, Take one po once. Repeat in 1 week if still with symptoms, Disp: 2 tablet, Rfl: 0     Multiple Vitamin (DAILY MULTIVITAMIN PO), , Disp: , Rfl:      Pramipexole Dihydrochloride (MIRAPEX PO), Take  by mouth., Disp: , Rfl:      simvastatin (ZOCOR) 40 MG tablet, TK 1 T PO QPM, Disp: , Rfl: 3     VESICARE 10 MG tablet, TK 1 T PO QD, Disp: , Rfl: 3    Allergies -   Allergies   Allergen Reactions     Gabapentin Other (See Comments)     Latex        Social History -   Social History     Socioeconomic History     Marital status:      Spouse name: Not on file     Number of children: Not on file     Years of education: Not on file     Highest education level: Not on file   Occupational History     Not on file   Social Needs     Financial resource strain: Not on file     Food insecurity     Worry: Not on file     Inability: Not on file     Transportation needs     Medical: Not on file     Non-medical: Not on file   Tobacco Use     Smoking status: Never Smoker     Smokeless tobacco: Never Used   Substance and Sexual Activity     Alcohol use: No     Drug use: No     Sexual activity: Not on file   Lifestyle     Physical activity     Days per week: Not on file     Minutes per session: Not on file     Stress: Not on file   Relationships     Social connections     Talks on phone: Not on  file     Gets together: Not on file     Attends Sikh service: Not on file     Active member of club or organization: Not on file     Attends meetings of clubs or organizations: Not on file     Relationship status: Not on file     Intimate partner violence     Fear of current or ex partner: Not on file     Emotionally abused: Not on file     Physically abused: Not on file     Forced sexual activity: Not on file   Other Topics Concern     Parent/sibling w/ CABG, MI or angioplasty before 65F 55M? Not Asked   Social History Narrative     Not on file       Family History - No family history on file.    Review of Systems - As per HPI and PMHx, otherwise 10+ system review of the head and neck, and general constitution is negative.    Physical Exam  /74   Pulse 72   Resp 16   Wt 69.4 kg (153 lb)   SpO2 95%   BMI 25.07 kg/m      General - The patient is well nourished and well developed, and appears to have good nutritional status.  Alert and oriented to person and place, answers questions and cooperates with examination appropriately.   Head and Face - Normocephalic and atraumatic, with no gross asymmetry noted of the contour of the facial features.  The facial nerve is intact, with strong symmetric movements.  Voice and Breathing - The patient was breathing comfortably without the use of accessory muscles. There was no wheezing, stridor, or stertor.  The patients voice was clear and strong, and had appropriate pitch and quality.  Ears - The LEFT ear was healthy and clear.  The RIGHT canal was totally filled with sticky cerumen.  Eyes - Extraocular movements intact, and the pupils were reactive to light.  Sclera were not icteric or injected, conjunctiva were pink and moist.      Cerumen Removal    Physical Exam and Procedure  Ears - On examination of the ears, I found that the RIGHT  side had cerumen impaction.  Therefore, I positioned them in the examination chair in a semi-supine position, beginning with  the right side.  I used the binocular surgical microscope to perform cerumen removal.  I began by using a cerumen loop to gently lift the edges of the cerumen mass away from the walls of the external canal.  Once I did this, I was able to suction away fragments of wax and debris using suction.  Once the mass was loose enough, the entire plug was pulled from the canal.  The tympanic membrane was intact, no sign of perforation or middle ear effusion.    A/P - Avelinakarel Walker is a 80 year old female  (H61.21) Impacted cerumen of right ear  (primary encounter diagnosis)    The patient has had their cerumen procedurally removed today.  I have discussed ear care at home, including avoiding qtips or any other object placed in the canal.  I have also discussed that over the counter cerumen kits like Debrox or Cerumenex can be useful.    If no other issues, follow up with me in one year for an ear check.

## 2020-11-06 NOTE — PATIENT INSTRUCTIONS
Scheduling Information  To schedule your CT/MRI scan, please contact [facility] at [phone] OR [facility] at [phone]    To schedule your Surgery, please contact our Specialty Schedulers at [phone]      ENT Clinic Locations Clinic Hours Telephone Number     [facility]  [address] [place] [address]   Monday:           1:00pm -- 5:00pm    Friday:              8:00am - 12:00pm   To schedule/reschedule an appointment with   Dr. Whittington,   please contact our   Specialty Scheduling Department at:     [phone]       [facility]  [address] [address] Tuesday:          8:00am -- 2:00pm         Urgent Care Locations Clinic Hours Telephone Numbers     [facility]  [address] [place] [address]     Monday-Friday:     11:00am - 9:00pm    Saturday-Sunday:  9:00am - 5:00pm   [phone]     [facility]  [address] [place] [address]     Monday-Friday:      5:00pm - 9:00pm     Saturday-Sunday:  9:00am - 5:00pm   [phone]

## 2020-11-09 ENCOUNTER — OFFICE VISIT (OUTPATIENT)
Dept: OTOLARYNGOLOGY | Facility: CLINIC | Age: 80
End: 2020-11-09
Payer: MEDICARE

## 2020-11-09 VITALS
RESPIRATION RATE: 16 BRPM | BODY MASS INDEX: 25.07 KG/M2 | HEART RATE: 72 BPM | OXYGEN SATURATION: 95 % | WEIGHT: 153 LBS | DIASTOLIC BLOOD PRESSURE: 74 MMHG | SYSTOLIC BLOOD PRESSURE: 127 MMHG

## 2020-11-09 DIAGNOSIS — H61.21 IMPACTED CERUMEN OF RIGHT EAR: Primary | ICD-10-CM

## 2020-11-09 PROCEDURE — 99203 OFFICE O/P NEW LOW 30 MIN: CPT | Mod: 25 | Performed by: OTOLARYNGOLOGY

## 2020-11-09 PROCEDURE — 69210 REMOVE IMPACTED EAR WAX UNI: CPT | Mod: RT | Performed by: OTOLARYNGOLOGY

## 2020-11-09 ASSESSMENT — PAIN SCALES - GENERAL: PAINLEVEL: NO PAIN (0)

## 2020-11-09 NOTE — LETTER
11/9/2020         RE: Avelina Walker  [address]  [place] [address]        Dear Colleague,    Thank you for referring your patient, Avelina Walker, to the [facility]. Please see a copy of my visit note below.    History of Present Illness - Avelina Walker is a 80 year old female here to see me for the first time for an ear problem.    She was seen at [facility] and it was noted that she had some hearing loss, RIGHT more than LEFT.  On exam she was noted to have severe cerumen impaction.    Otherwise no history of any ear disease or ear surgery.  No drainage or pain from the ears.    Past Medical History -   Patient Active Problem List   Diagnosis     Blepharoconjunctivitis of both eyes       Current Medications -   Current Outpatient Medications:      Celecoxib (CELEBREX PO), Take  by mouth., Disp: , Rfl:      fluconazole (DIFLUCAN) 150 MG tablet, Take one po once. Repeat in 1 week if still with symptoms, Disp: 2 tablet, Rfl: 0     Multiple Vitamin (DAILY MULTIVITAMIN PO), , Disp: , Rfl:      Pramipexole Dihydrochloride (MIRAPEX PO), Take  by mouth., Disp: , Rfl:      simvastatin (ZOCOR) 40 MG tablet, TK 1 T PO QPM, Disp: , Rfl: 3     VESICARE 10 MG tablet, TK 1 T PO QD, Disp: , Rfl: 3    Allergies -   Allergies   Allergen Reactions     Gabapentin Other (See Comments)     Latex        Social History -   Social History     Socioeconomic History     Marital status:      Spouse name: Not on file     Number of children: Not on file     Years of education: Not on file     Highest education level: Not on file   Occupational History     Not on file   Social Needs     Financial resource strain: Not on file     Food insecurity     Worry: Not on file     Inability: Not on file     Transportation needs     Medical: Not on file     Non-medical: Not on file   Tobacco Use     Smoking status: Never Smoker     Smokeless tobacco: Never Used   Substance and Sexual  Activity     Alcohol use: No     Drug use: No     Sexual activity: Not on file   Lifestyle     Physical activity     Days per week: Not on file     Minutes per session: Not on file     Stress: Not on file   Relationships     Social connections     Talks on phone: Not on file     Gets together: Not on file     Attends Christianity service: Not on file     Active member of club or organization: Not on file     Attends meetings of clubs or organizations: Not on file     Relationship status: Not on file     Intimate partner violence     Fear of current or ex partner: Not on file     Emotionally abused: Not on file     Physically abused: Not on file     Forced sexual activity: Not on file   Other Topics Concern     Parent/sibling w/ CABG, MI or angioplasty before 65F 55M? Not Asked   Social History Narrative     Not on file       Family History - No family history on file.    Review of Systems - As per HPI and PMHx, otherwise 10+ system review of the head and neck, and general constitution is negative.    Physical Exam  /74   Pulse 72   Resp 16   Wt 69.4 kg (153 lb)   SpO2 95%   BMI 25.07 kg/m      General - The patient is well nourished and well developed, and appears to have good nutritional status.  Alert and oriented to person and place, answers questions and cooperates with examination appropriately.   Head and Face - Normocephalic and atraumatic, with no gross asymmetry noted of the contour of the facial features.  The facial nerve is intact, with strong symmetric movements.  Voice and Breathing - The patient was breathing comfortably without the use of accessory muscles. There was no wheezing, stridor, or stertor.  The patients voice was clear and strong, and had appropriate pitch and quality.  Ears - The LEFT ear was healthy and clear.  The RIGHT canal was totally filled with sticky cerumen.  Eyes - Extraocular movements intact, and the pupils were reactive to light.  Sclera were not icteric or injected,  conjunctiva were pink and moist.      Cerumen Removal    Physical Exam and Procedure  Ears - On examination of the ears, I found that the RIGHT  side had cerumen impaction.  Therefore, I positioned them in the examination chair in a semi-supine position, beginning with the right side.  I used the binocular surgical microscope to perform cerumen removal.  I began by using a cerumen loop to gently lift the edges of the cerumen mass away from the walls of the external canal.  Once I did this, I was able to suction away fragments of wax and debris using suction.  Once the mass was loose enough, the entire plug was pulled from the canal.  The tympanic membrane was intact, no sign of perforation or middle ear effusion.    A/P - Avelina Walker is a 80 year old female  (H61.21) Impacted cerumen of right ear  (primary encounter diagnosis)    The patient has had their cerumen procedurally removed today.  I have discussed ear care at home, including avoiding qtips or any other object placed in the canal.  I have also discussed that over the counter cerumen kits like Debrox or Cerumenex can be useful.    If no other issues, follow up with me in one year for an ear check.          Again, thank you for allowing me to participate in the care of your patient.        Sincerely,        Dwayne Whittington MD